# Patient Record
Sex: FEMALE | Race: WHITE
[De-identification: names, ages, dates, MRNs, and addresses within clinical notes are randomized per-mention and may not be internally consistent; named-entity substitution may affect disease eponyms.]

---

## 2024-02-26 ENCOUNTER — TELEPHONE (OUTPATIENT)
Facility: HOSPITAL | Age: 69
End: 2024-02-26

## 2024-03-04 ENCOUNTER — HOSPITAL ENCOUNTER (OUTPATIENT)
Facility: HOSPITAL | Age: 69
Setting detail: RECURRING SERIES
Discharge: HOME | End: 2024-03-07
Payer: MEDICARE

## 2024-03-04 PROCEDURE — 97162 PT EVAL MOD COMPLEX 30 MIN: CPT

## 2024-03-04 PROCEDURE — 97535 SELF CARE MNGMENT TRAINING: CPT

## 2024-03-04 NOTE — PROGRESS NOTES
[facility]  [address] [place] [address]  Ph [phone]  Fx [phone]    Plan of Care/ Statement of Necessity for Physical Therapy Services    Patient name: Betty Zhao Start of Care: 3/4/2024   Referral source: Kristina Tsang MD : 1955    Medical Diagnosis: Other symptoms and signs involving the genitourinary system [R39.89]   Onset Date:3/4/2014    Treatment Diagnosis: Other symptoms and signs involving the genitourinary system [R39.89]   Prior Hospitalization: see medical history Provider#: [id number]   Medications: Verified on Patient summary List    Comorbidities: total hysterectomy and bladder tack 15 years ago; 4 pregnancies: 3 vaginal births, 1 c section; right foot surgery 13 years ago (part of hardware removed 2 years ago); right side sciatic nerve symptoms     Prior Level of Function: no urinary incontinence or urgency           The Plan of Care and following information is based on the information from the initial evaluation.  Assessment/ key information: Patient is a 68 year old female presenting to Physical Therapy with c/o mixed urinary incontinence, urinary urgency, and prolapse which is limiting ability function at previous level. Patient has bladder pain complaints with sensation of bladder fullness and reports daily incontinence liner use due to urinary leakage. Patient also reports history of back pain and frequent UTIs.  Discussed with patient how internal pelvic floor assessment is performed upon consent; patient stated may be interested at treatment session. Patient presents with decreased strength, coordination, and endurance of the pelvic floor muscles and decreased strength of postural stabilizers. Patient presents with fair understanding of bladder and bowel anatomy/function, dietary irritants, and urge suppression. I feel patient would benefit from skilled therapeutic intervention to optimize highest functional level possible.    
and bowel anatomy/function, dietary irritants, and urge suppression. I feel patient would benefit from skilled therapeutic intervention to optimize highest functional level possible.     Patient will continue to benefit from skilled PT services to modify and progress therapeutic interventions, address functional mobility deficits, address ROM deficits, address strength deficits, analyze and address soft tissue restrictions, analyze and cue movement patterns, analyze and modify body mechanics/ergonomics, and assess and modify postural abnormalities to attain remaining goals.      [x]  See Plan of Care  []  See progress note/recertification  []  See Discharge Summary         Progress towards goals / Updated goals:  Short term goals: To be achieved in 6 treatments::    Pt demonstrates proper dietary/fluid habits & urge suppression strategies that promote bladder & bowel health to aid in management of urinary urgency & incontinence.  Eval: Pt consuming insufficient amount of water & unaware of bladder fitness, dietary irritants & urge suppression strategies.     Patient will report adherence to HEP as recommended for active participation and progression of interventions during therapy.    Eval: initiate at next visit    Pt reports improvement in UI complaints evidenced by decrease in pad usage &/or amount of leakage by 90% to improve QOL.  Eval: Pt using 2-3 incontinence liners per day, generally varies from a few drops to soaked (amt of leakage).     Patient will report urinating 0-1 time per night to improve quality of sleep.  Eval: Patient awakening 1-2 times per night to urinate.     Patient will demonstrate/verbalize correct pressure management techniques during exercises and ADLs to decreased strain on pelvic organs/prolapse contributing to symptoms.   Eval: patient unaware of pressure management     Long term goals: To be achieved in 12 treatments::    Pt reports bladder continence 90% of the time with

## 2024-03-11 ENCOUNTER — HOSPITAL ENCOUNTER (OUTPATIENT)
Facility: HOSPITAL | Age: 69
Setting detail: RECURRING SERIES
Discharge: HOME | End: 2024-03-14
Payer: MEDICARE

## 2024-03-11 PROCEDURE — 97535 SELF CARE MNGMENT TRAINING: CPT

## 2024-03-11 PROCEDURE — 97112 NEUROMUSCULAR REEDUCATION: CPT

## 2024-03-11 NOTE — PROGRESS NOTES
demonstrates improvement of current complaints evidenced by a 9 point  improvement in FOTO score.  Eval: FOTO 51  FOTO score=established functional score where 100=no disability     Pt demonstrates independence with management tools & exercise program that are beneficial for current condition in order to feel comfortable with Pelvic floor PT D/C & not fear.  Eval: pt fearful of return to exercise & unaware of what activities to avoid to avoid exacerbation of current condition       PLAN  Yes  Continue plan of care  []  Upgrade activities as tolerated  []  Discharge due to :  []  Other:    Sharon Mathews, PT    3/11/2024    7:10 AM    Future Appointments   Date Time Provider Department Center   3/11/2024  8:30 AM Sharon Mathews, PT [facility]

## 2024-03-20 ENCOUNTER — HOSPITAL ENCOUNTER (OUTPATIENT)
Facility: HOSPITAL | Age: 69
Setting detail: RECURRING SERIES
Discharge: HOME | End: 2024-03-23
Payer: MEDICARE

## 2024-03-20 PROCEDURE — 97530 THERAPEUTIC ACTIVITIES: CPT

## 2024-03-20 PROCEDURE — 97112 NEUROMUSCULAR REEDUCATION: CPT

## 2024-03-20 PROCEDURE — 97535 SELF CARE MNGMENT TRAINING: CPT

## 2024-03-20 NOTE — PROGRESS NOTES
adherence to HEP as recommended for active participation and progression of interventions during therapy.    Eval: initiate at next visit  3/11: HEP initiated  3/20: patient reports compliance with HEP, updated today     Pt reports improvement in UI complaints evidenced by decrease in pad usage &/or amount of leakage by 90% to improve QOL.  Eval: Pt using 2-3 incontinence liners per day, generally varies from a few drops to soaked (amt of leakage).      Patient will report urinating 0-1 time per night to improve quality of sleep.  Eval: Patient awakening 1-2 times per night to urinate.      Patient will demonstrate/verbalize correct pressure management techniques during exercises and ADLs to decreased strain on pelvic organs/prolapse contributing to symptoms.   Eval: patient unaware of pressure management      Long term goals: To be achieved in 12 treatments::     Pt reports bladder continence 90% of the time with cough/sneeze/laugh & walking to the toilet.   Eval: pt stress & urgency incontinence 1-2 times per day     Patient will reduce the number of daytime voids to every 3-4 hours to demonstrate successful bladder training program and improve productivity.   Eval: Patient reports 1 void every 1-1.5 hours       Patient will be able to elicit PFMC without verbal or tactile cueing and no accessory substitution or breath holding to improve strength and decrease reported symptoms.  Eval: patient educated on pelvic floor anatomy, function, and performance of PFC      Pt demonstrates improvement of current complaints evidenced by a 9 point  improvement in FOTO score.  Eval: FOTO 51  FOTO score=established functional score where 100=no disability     Pt demonstrates independence with management tools & exercise program that are beneficial for current condition in order to feel comfortable with Pelvic floor PT D/C & not fear.  Eval: pt fearful of return to exercise & unaware of what activities to avoid to avoid

## 2024-03-27 ENCOUNTER — TELEPHONE (OUTPATIENT)
Facility: HOSPITAL | Age: 69
End: 2024-03-27

## 2024-03-27 NOTE — TELEPHONE ENCOUNTER
Pt. called to cxl due to having family coming in town. Declines to r/s. WCB to r/s at a later date.

## 2024-03-28 ENCOUNTER — APPOINTMENT (OUTPATIENT)
Facility: HOSPITAL | Age: 69
End: 2024-03-28
Payer: MEDICARE

## 2024-04-10 ENCOUNTER — TELEPHONE (OUTPATIENT)
Facility: HOSPITAL | Age: 69
End: 2024-04-10